# Patient Record
Sex: FEMALE | Race: WHITE
[De-identification: names, ages, dates, MRNs, and addresses within clinical notes are randomized per-mention and may not be internally consistent; named-entity substitution may affect disease eponyms.]

---

## 2017-12-05 ENCOUNTER — HOSPITAL ENCOUNTER (OUTPATIENT)
Dept: HOSPITAL 75 - RAD | Age: 32
End: 2017-12-05
Attending: NURSE PRACTITIONER
Payer: COMMERCIAL

## 2017-12-05 DIAGNOSIS — R10.2: Primary | ICD-10-CM

## 2017-12-05 PROCEDURE — 76830 TRANSVAGINAL US NON-OB: CPT

## 2017-12-05 PROCEDURE — 76856 US EXAM PELVIC COMPLETE: CPT

## 2017-12-05 NOTE — DIAGNOSTIC IMAGING REPORT
Transabdominal and transvaginal pelvic ultrasound.



INDICATION: Left pelvic pain.



FINDINGS: The uterus is 10.6 x 6.8 x 5.3 cm. The endometrial

stripe is 0.6 cm in thickness. No myometrial mass is identified.

The ovaries are not seen, probably obscured by bowel gas.



IMPRESSION: The ovaries are not seen.



Dictated by: 



  Dictated on workstation # [id number]

## 2018-03-02 ENCOUNTER — HOSPITAL ENCOUNTER (OUTPATIENT)
Dept: HOSPITAL 75 - RAD | Age: 33
End: 2018-03-02
Attending: NURSE PRACTITIONER
Payer: COMMERCIAL

## 2018-03-02 DIAGNOSIS — Z80.3: ICD-10-CM

## 2018-03-02 DIAGNOSIS — Z12.31: Primary | ICD-10-CM

## 2018-03-02 DIAGNOSIS — N60.12: ICD-10-CM

## 2018-03-02 DIAGNOSIS — N60.11: ICD-10-CM

## 2018-03-02 PROCEDURE — 77067 SCR MAMMO BI INCL CAD: CPT

## 2018-03-02 NOTE — DIAGNOSTIC IMAGING REPORT
INDICATION: 

Routine screening.



COMPARISON:   

12/22/2015 and 02/11/2015.



TECHNIQUE: 

Screening digital mammography was performed bilaterally with a

Computer Aided Detection (CAD) system.



FINDINGS:

Mild parenchymal density is identified bilaterally. The

parenchymal pattern appears stable. No dominant mass or malignant

appearing microcalcifications are seen. The axillae are

unremarkable.



IMPRESSION: 

No mammographic features suspicious for malignancy are

identified.



ACR BI-RADS Category 1: Negative.

Result letter will be mailed to the patient.

Note:  At least 10% of breast cancer is not imaged by

mammography.



Dictated by: 



  Dictated on workstation # [id number]

## 2019-06-28 ENCOUNTER — HOSPITAL ENCOUNTER (OUTPATIENT)
Dept: HOSPITAL 75 - RAD | Age: 34
End: 2019-06-28
Attending: NURSE PRACTITIONER
Payer: COMMERCIAL

## 2019-06-28 DIAGNOSIS — N88.8: ICD-10-CM

## 2019-06-28 DIAGNOSIS — Z12.31: Primary | ICD-10-CM

## 2019-06-28 DIAGNOSIS — N93.8: ICD-10-CM

## 2019-06-28 DIAGNOSIS — Z01.419: ICD-10-CM

## 2019-06-28 PROCEDURE — 77067 SCR MAMMO BI INCL CAD: CPT

## 2019-06-28 PROCEDURE — 76830 TRANSVAGINAL US NON-OB: CPT

## 2019-06-28 PROCEDURE — 76856 US EXAM PELVIC COMPLETE: CPT

## 2019-06-28 NOTE — DIAGNOSTIC IMAGING REPORT
PROCEDURE: US Non-ob pelvis comp/trans.



TECHNIQUE: Multiple real-time grayscale images were obtained of

the pelvis in various projections endovaginally.



Transabdominal imaging was also performed.



INDICATION: Dysfunctional uterine bleeding.



FINDINGS: The endometrium is 5 mm thick and appears homogeneous.

There is some fluid within the endocervical cavity. There is no

fibroid or myometrial mass. The ovaries are not identified. There

is no adnexal lesion. There are cervical nabothian cysts

measuring 9 mm maximal.



IMPRESSION: There is some fluid within the endocervical canal.

The echogenic endometrial mucosa is non-thickened. There is no

fibroid or myometrial mass. Cervical nabothian cysts noted

incidentally.



Dictated by: 



  Dictated on workstation # [id number]

## 2021-03-23 ENCOUNTER — HOSPITAL ENCOUNTER (OUTPATIENT)
Dept: HOSPITAL 75 - RAD | Age: 36
End: 2021-03-23
Attending: PHYSICIAN ASSISTANT
Payer: COMMERCIAL

## 2021-03-23 DIAGNOSIS — Z12.31: Primary | ICD-10-CM

## 2021-03-23 PROCEDURE — 77067 SCR MAMMO BI INCL CAD: CPT

## 2021-03-23 PROCEDURE — 77063 BREAST TOMOSYNTHESIS BI: CPT

## 2023-03-31 ENCOUNTER — HOSPITAL ENCOUNTER (OUTPATIENT)
Dept: HOSPITAL 75 - RAD | Age: 38
End: 2023-03-31
Attending: SURGERY
Payer: COMMERCIAL

## 2023-03-31 DIAGNOSIS — Z12.31: Primary | ICD-10-CM

## 2023-03-31 PROCEDURE — 77063 BREAST TOMOSYNTHESIS BI: CPT

## 2023-03-31 PROCEDURE — 77067 SCR MAMMO BI INCL CAD: CPT

## 2023-03-31 NOTE — DIAGNOSTIC IMAGING REPORT
Indication: Routine screening.



Comparison is made with prior mammograms 3/23/2021 and 6/28/2019.



2-D and 3-D bilateral screening mammography was performed with

CAD.



Scattered fibroglandular densities are identified bilaterally.

The overall parenchymal pattern is stable. No dominant mass or

malignant-appearing microcalcifications are seen. Axillae are

unremarkable.



IMPRESSION: BI-RADS Category 1



No mammographic features suspicious for malignancy are

identified.



ACR BI-RADS Category 1: Negative.

Result letter will be mailed to the patient.

Note:  At least 10% of breast cancer is not imaged by

mammography.





  Dictated on workstation # [id number]